# Patient Record
Sex: FEMALE | Race: WHITE | NOT HISPANIC OR LATINO | Employment: FULL TIME
[De-identification: names, ages, dates, MRNs, and addresses within clinical notes are randomized per-mention and may not be internally consistent; named-entity substitution may affect disease eponyms.]

---

## 2017-07-22 ENCOUNTER — HEALTH MAINTENANCE LETTER (OUTPATIENT)
Age: 52
End: 2017-07-22

## 2018-09-24 ENCOUNTER — HOSPITAL ENCOUNTER (OUTPATIENT)
Dept: MAMMOGRAPHY | Facility: CLINIC | Age: 53
Discharge: HOME | End: 2018-09-24
Attending: OBSTETRICS & GYNECOLOGY | Admitting: OBSTETRICS & GYNECOLOGY
Payer: COMMERCIAL

## 2018-09-24 DIAGNOSIS — Z12.31 VISIT FOR SCREENING MAMMOGRAM: ICD-10-CM

## 2018-09-24 PROCEDURE — 77067 SCR MAMMO BI INCL CAD: CPT

## 2019-10-18 ENCOUNTER — HOSPITAL ENCOUNTER (OUTPATIENT)
Dept: MAMMOGRAPHY | Facility: CLINIC | Age: 54
Discharge: HOME | End: 2019-10-18
Attending: OBSTETRICS & GYNECOLOGY | Admitting: OBSTETRICS & GYNECOLOGY
Payer: COMMERCIAL

## 2019-10-18 DIAGNOSIS — Z12.31 VISIT FOR SCREENING MAMMOGRAM: ICD-10-CM

## 2019-10-18 PROCEDURE — 77063 BREAST TOMOSYNTHESIS BI: CPT

## 2019-11-03 ENCOUNTER — HEALTH MAINTENANCE LETTER (OUTPATIENT)
Age: 54
End: 2019-11-03

## 2020-10-10 ENCOUNTER — NURSE TRIAGE (OUTPATIENT)
Dept: NURSING | Facility: CLINIC | Age: 55
End: 2020-10-10

## 2020-10-10 ENCOUNTER — ANCILLARY PROCEDURE (OUTPATIENT)
Dept: GENERAL RADIOLOGY | Facility: CLINIC | Age: 55
End: 2020-10-10
Attending: FAMILY MEDICINE
Payer: COMMERCIAL

## 2020-10-10 ENCOUNTER — OFFICE VISIT (OUTPATIENT)
Dept: URGENT CARE | Facility: URGENT CARE | Age: 55
End: 2020-10-10
Payer: COMMERCIAL

## 2020-10-10 VITALS
DIASTOLIC BLOOD PRESSURE: 84 MMHG | TEMPERATURE: 98.4 F | WEIGHT: 188 LBS | RESPIRATION RATE: 20 BRPM | OXYGEN SATURATION: 100 % | BODY MASS INDEX: 31.28 KG/M2 | HEART RATE: 80 BPM | SYSTOLIC BLOOD PRESSURE: 118 MMHG

## 2020-10-10 DIAGNOSIS — M79.671 RIGHT FOOT PAIN: Primary | ICD-10-CM

## 2020-10-10 PROCEDURE — 99204 OFFICE O/P NEW MOD 45 MIN: CPT | Performed by: FAMILY MEDICINE

## 2020-10-10 PROCEDURE — 73630 X-RAY EXAM OF FOOT: CPT | Mod: RT | Performed by: RADIOLOGY

## 2020-10-10 RX ORDER — HYDROCODONE BITARTRATE AND ACETAMINOPHEN 5; 325 MG/1; MG/1
1 TABLET ORAL EVERY 6 HOURS PRN
Qty: 10 TABLET | Refills: 0 | Status: SHIPPED | OUTPATIENT
Start: 2020-10-10 | End: 2020-10-13

## 2020-10-10 NOTE — PATIENT INSTRUCTIONS
1. Heat to ankle . 10 minutes   2. Plantar fasciitis treatment to the right foot .    3.  Elevate foot and ankle when possible    4.  Expecting improvement over this week please have this seen.  Sooner if no improvement

## 2020-10-10 NOTE — PROGRESS NOTES
SUBJECTIVE:   Mikki Das is a 55 year old female presenting with a chief complaint of   Chief Complaint   Patient presents with     Urgent Care     3 days, tried advil and ice     Musculoskeletal Problem     Constant pain in right foot, locatiokn of pain moves, ankle is also swollen and tender spot on lower leg     She has had this pain for the 3 days and is tried measures that have not decreased her pain or her swelling.  On her foot the plantar aspect on the heel is the area of greatest tenderness.  She says it is a constant tenderness that she has in this area and on her foot.  She has had plantar fasciitis in the left foot and she said this is not similar because the pain is constant.    She says she has some swelling more in the area of the plantar fascia and an area of tenderness on the right ankle medial near the joint.  She is not complaining of any pain in her calf she is walking with a limp because of the pain in the foot.    She has had no fever no chills.  Her past medical history is not significant for DVT plantar fasciitis yes.        She is an established patient of [facility].        Review of Systems   Musculoskeletal:        Right foot pain bottom of the foot.  Right inner ankle pain swelling   All other systems reviewed and are negative.      Past Medical History:   Diagnosis Date     NO ACTIVE PROBLEMS      Family History   Problem Relation Age of Onset     Lipids Father      Current Outpatient Medications   Medication Sig Dispense Refill     HYDROcodone-acetaminophen (NORCO) 5-325 MG tablet Take 1 tablet by mouth every 6 hours as needed for severe pain 10 tablet 0     nabumetone (RELAFEN) 750 MG tablet Take 1 tablet (750 mg) by mouth 2 times daily 20 tablet 0     SPRINTEC 28 0.25-35 MG-MCG OR TABS 1 TABLET DAILY 28 0     Ciclopirox 8 % SOLN Externally apply 1 drop topically daily. (Patient not taking: Reported on 10/10/2020) 1 Bottle 3     Social History     Tobacco Use     Smoking status:  Never Smoker     Smokeless tobacco: Never Used   Substance Use Topics     Alcohol use: Yes     Comment: rare       OBJECTIVE  /84   Pulse 80   Temp 98.4  F (36.9  C) (Tympanic)   Resp 20   Wt 85.3 kg (188 lb)   SpO2 100%   BMI 31.28 kg/m      Physical Exam  Vitals signs and nursing note reviewed.   HENT:      Head: Normocephalic.   Eyes:      Extraocular Movements: Extraocular movements intact.      Pupils: Pupils are equal, round, and reactive to light.   Neck:      Musculoskeletal: Normal range of motion.   Cardiovascular:      Rate and Rhythm: Normal rate.   Pulmonary:      Effort: Pulmonary effort is normal.   Musculoskeletal:      Comments: She walks with a limp.  Lightly trending on the right foot.    Right foot plantar fascial pain to palpation.  Remainder of the foot seem to have minimal if any tenderness.    Right inner ankle from approximately ankle joint midline to the lateral side of the ankle there is tenderness.  Linear approximately 5 cm in length and appears swollen.  In addition it appears to be full or palpable underneath the skin.  The skin is showing no changes   Skin:     General: Skin is warm.   Neurological:      General: No focal deficit present.      Mental Status: She is alert.         Labs:  Results for orders placed or performed in visit on 10/10/20 (from the past 24 hour(s))   XR Foot Right G/E 3 Views    Narrative    FOOT RIGHT THREE OR MORE VIEWS October 10, 2020 12:08 PM     INDICATION: Right foot pain.     COMPARISON: None.      Impression    IMPRESSION:  1.  Bunion deformity. Mild hallux valgus and hypertrophic changes at  the first metatarsal head median eminence.  2.  Mild first metatarsophalangeal degenerative arthrosis.  3.  Plantar calcaneal spur.  4.  No fracture.    EMERSON CAMPBELL MD       X-Ray was done; x-ray shows no evidence of fracture.  She does have a calcaneal spur    ASSESSMENT:      ICD-10-CM    1. Right foot pain  M79.671 XR Foot Right G/E 3 Views      nabumetone (RELAFEN) 750 MG tablet     HYDROcodone-acetaminophen (NORCO) 5-325 MG tablet    2.  Superficial thrombophlebitis right inner ankle; I did palpate her calf and there is no evidence signs or symptoms to suggest that she has a DVT.  I do think that she has a superficial thrombophlebitis.  This is causing some swelling around the ankle and it may be even influencing the plantar aspect of her foot.    We did discuss that she needs to be improving with the medications and treatment to the foot.  If there is no improvement next 24 to 48 hours she should be seen.    If her calf starts to hurt or she is having increased inability to walk she needs to be seen ASAP    She was having no breathing problems today    Medical Decision Making:    Differential Diagnosis:  Thrombophlebitis, foot pain, plantar fasciitis,    Serious Comorbid Conditions:  Adult:  None    PLAN:        Followup:    Please follow-up this week    Patient Instructions   1. Heat to ankle . 10 minutes   2. Plantar fasciitis treatment to the right foot .    3.  Elevate foot and ankle when possible    4.  Expecting improvement over this week please have this seen.  Sooner if no improvement

## 2020-10-10 NOTE — TELEPHONE ENCOUNTER
Called regarding right foot pain (described pain to be intense).  States that pain goes back and forth from foot to heel and arch and this has been going on x 3 days.  States that there is a spot on the calf that is slightly bruised and painful.  Caller states she could not sleep last night due to pain despite taken pain medication (ibuprofen) and using ice pack.  States that it is painful when she put weight on the foot.  Caller states that she noticed a mild swelling around the ankle this morning  Caller denied any known injury.  Chary Mcdaniel RN  COVID 19 Nurse Triage Plan/Patient Instructions    Please be aware that novel coronavirus (COVID-19) may be circulating in the community. If you develop symptoms such as fever, cough, or SOB or if you have concerns about the presence of another infection including coronavirus (COVID-19), please contact your health care provider or visit [email or web address].     Disposition/Instructions    In-Person Visit with provider recommended. Reference Visit Selection Guide.    Thank you for taking steps to prevent the spread of this virus.  o Limit your contact with others.  o Wear a simple mask to cover your cough.  o Wash your hands well and often.    Resources    [facility]: About COVID-19: [email or web address]    CDC: What to Do If You're Sick: www.cdc.gov/coronavirus/2019-ncov/about/steps-when-sick.html    CDC: Ending Home Isolation: www.cdc.gov/coronavirus/2019-ncov/hcp/disposition-in-home-patients.html     CDC: Caring for Someone: www.cdc.gov/coronavirus/2019-ncov/if-you-are-sick/care-for-someone.html     [facility]: Interim Guidance for Hospital Discharge to Home: www.health.[facility].[place]/diseases/coronavirus/hcp/hospdischarge.pdf    [facility] clinical trials (COVID-19 research studies): [email or web address]/umn-clinical-trials     Below are the COVID-19 hotlines at the [place] Department of [facility] ([facility]). Interpreters are available.   o For [employer]  questions: Call 583-441-3853 or 1-857.706.1330 (7 a.m. to 7 p.m.)  o For questions about schools and childcare: Call 436-807-7891 or 1-809.362.9070 (7 a.m. to 7 p.m.)                     Additional Information    Negative: Followed a foot injury    Negative: Diabetes    Negative: Ankle pain is main symptom    Negative: Thigh or calf pain is main symptom    Negative: Entire foot is cool or blue in comparison to other foot    Negative: Purple or black skin on foot or toe    Negative: [1] Red area or streak AND [2] fever    Negative: [1] Swollen foot AND [2] fever    Negative: Patient sounds very sick or weak to the triager    [1] SEVERE pain (e.g., excruciating, unable to do any normal activities) AND [2] not improved after 2 hours of pain medicine    Protocols used: FOOT PAIN-A-AH

## 2020-11-16 ENCOUNTER — HEALTH MAINTENANCE LETTER (OUTPATIENT)
Age: 55
End: 2020-11-16

## 2020-12-01 ENCOUNTER — TRANSFERRED RECORDS (OUTPATIENT)
Dept: MULTI SPECIALTY CLINIC | Facility: CLINIC | Age: 55
End: 2020-12-01

## 2020-12-01 LAB — PAP SMEAR - HIM PATIENT REPORTED: NEGATIVE

## 2020-12-17 ENCOUNTER — TRANSFERRED RECORDS (OUTPATIENT)
Dept: HEALTH INFORMATION MANAGEMENT | Facility: CLINIC | Age: 55
End: 2020-12-17

## 2020-12-17 LAB — PAP-ABSTRACT: NORMAL

## 2021-02-07 ENCOUNTER — HEALTH MAINTENANCE LETTER (OUTPATIENT)
Age: 56
End: 2021-02-07

## 2021-02-09 ENCOUNTER — TRANSFERRED RECORDS (OUTPATIENT)
Dept: HEALTH INFORMATION MANAGEMENT | Facility: CLINIC | Age: 56
End: 2021-02-09

## 2021-02-09 LAB
ALT SERPL-CCNC: 17 LU/L (ref 12–68)
AST SERPL-CCNC: 6 LU/L (ref 12–37)
CHOLESTEROL (EXTERNAL): 193 MG/DL
CREATININE (EXTERNAL): 0.92 MG/DL (ref 0.55–1.02)
GFR ESTIMATED (EXTERNAL): >60 ML/MIN
GFR ESTIMATED (IF AFRICAN AMERICAN) (EXTERNAL): >60 ML/MIN
GLUCOSE (EXTERNAL): 91 MG/DL (ref 74–106)
HDLC SERPL-MCNC: 52 MG/DL
LDL CHOLESTEROL CALCULATED (EXTERNAL): 112 MG/DL
POTASSIUM (EXTERNAL): 4.3 MMOL/L (ref 3.5–5.1)
TRIGLYCERIDES (EXTERNAL): 146 MG/DL
TSH SERPL-ACNC: 2.52 ULU/ML (ref 0.36–3.74)

## 2021-02-11 ENCOUNTER — HOSPITAL ENCOUNTER (OUTPATIENT)
Dept: MAMMOGRAPHY | Facility: CLINIC | Age: 56
Discharge: HOME | End: 2021-02-11
Attending: OBSTETRICS & GYNECOLOGY | Admitting: OBSTETRICS & GYNECOLOGY
Payer: COMMERCIAL

## 2021-02-11 DIAGNOSIS — Z12.31 VISIT FOR SCREENING MAMMOGRAM: ICD-10-CM

## 2021-02-11 PROCEDURE — 77063 BREAST TOMOSYNTHESIS BI: CPT

## 2021-04-07 ENCOUNTER — IMMUNIZATION (OUTPATIENT)
Dept: NURSING | Facility: CLINIC | Age: 56
End: 2021-04-07
Payer: COMMERCIAL

## 2021-04-07 PROCEDURE — 91300 PR COVID VAC PFIZER DIL RECON 30 MCG/0.3 ML IM: CPT

## 2021-04-07 PROCEDURE — 0001A PR COVID VAC PFIZER DIL RECON 30 MCG/0.3 ML IM: CPT

## 2021-04-28 ENCOUNTER — IMMUNIZATION (OUTPATIENT)
Dept: NURSING | Facility: CLINIC | Age: 56
End: 2021-04-28
Attending: INTERNAL MEDICINE
Payer: COMMERCIAL

## 2021-04-28 PROCEDURE — 91300 PR COVID VAC PFIZER DIL RECON 30 MCG/0.3 ML IM: CPT

## 2021-04-28 PROCEDURE — 0002A PR COVID VAC PFIZER DIL RECON 30 MCG/0.3 ML IM: CPT

## 2021-08-25 ENCOUNTER — APPOINTMENT (OUTPATIENT)
Dept: CT IMAGING | Facility: CLINIC | Age: 56
End: 2021-08-25
Attending: EMERGENCY MEDICINE
Payer: COMMERCIAL

## 2021-08-25 ENCOUNTER — APPOINTMENT (OUTPATIENT)
Dept: MRI IMAGING | Facility: CLINIC | Age: 56
End: 2021-08-25
Attending: EMERGENCY MEDICINE
Payer: COMMERCIAL

## 2021-08-25 ENCOUNTER — APPOINTMENT (OUTPATIENT)
Dept: CT IMAGING | Facility: CLINIC | Age: 56
End: 2021-08-25
Attending: PHYSICIAN ASSISTANT
Payer: COMMERCIAL

## 2021-08-25 ENCOUNTER — HOSPITAL ENCOUNTER (OUTPATIENT)
Facility: CLINIC | Age: 56
Setting detail: OBSERVATION
Discharge: OTHER INSTITUTION | End: 2021-08-25
Attending: EMERGENCY MEDICINE | Admitting: INTERNAL MEDICINE
Payer: COMMERCIAL

## 2021-08-25 VITALS
DIASTOLIC BLOOD PRESSURE: 66 MMHG | WEIGHT: 188 LBS | SYSTOLIC BLOOD PRESSURE: 110 MMHG | HEART RATE: 72 BPM | BODY MASS INDEX: 30.22 KG/M2 | HEIGHT: 66 IN | OXYGEN SATURATION: 99 % | RESPIRATION RATE: 20 BRPM | TEMPERATURE: 98.2 F

## 2021-08-25 DIAGNOSIS — R29.898 WEAKNESS OF LEFT ARM: ICD-10-CM

## 2021-08-25 DIAGNOSIS — D64.9 ANEMIA, UNSPECIFIED TYPE: ICD-10-CM

## 2021-08-25 DIAGNOSIS — G40.909 RECURRENT SEIZURES (H): ICD-10-CM

## 2021-08-25 LAB
ALBUMIN SERPL-MCNC: 3.2 G/DL (ref 3.4–5)
ALBUMIN UR-MCNC: NEGATIVE MG/DL
ALP SERPL-CCNC: 60 U/L (ref 40–150)
ALT SERPL W P-5'-P-CCNC: 14 U/L (ref 0–50)
ANION GAP SERPL CALCULATED.3IONS-SCNC: 9 MMOL/L (ref 3–14)
APPEARANCE UR: CLEAR
AST SERPL W P-5'-P-CCNC: 10 U/L (ref 0–45)
ATRIAL RATE - MUSE: 103 BPM
BASOPHILS # BLD AUTO: 0.1 10E3/UL (ref 0–0.2)
BASOPHILS NFR BLD AUTO: 1 %
BILIRUB SERPL-MCNC: 0.2 MG/DL (ref 0.2–1.3)
BILIRUB UR QL STRIP: NEGATIVE
BUN SERPL-MCNC: 15 MG/DL (ref 7–30)
CALCIUM SERPL-MCNC: 8.1 MG/DL (ref 8.5–10.1)
CANCER AG125 SERPL-ACNC: 31 U/ML (ref 0–30)
CHLORIDE BLD-SCNC: 112 MMOL/L (ref 94–109)
CO2 SERPL-SCNC: 19 MMOL/L (ref 20–32)
COLOR UR AUTO: NORMAL
CREAT SERPL-MCNC: 0.96 MG/DL (ref 0.52–1.04)
DIASTOLIC BLOOD PRESSURE - MUSE: NORMAL MMHG
EOSINOPHIL # BLD AUTO: 0.1 10E3/UL (ref 0–0.7)
EOSINOPHIL NFR BLD AUTO: 2 %
ERYTHROCYTE [DISTWIDTH] IN BLOOD BY AUTOMATED COUNT: 16.4 % (ref 10–15)
ETHANOL SERPL-MCNC: <0.01 G/DL
GFR SERPL CREATININE-BSD FRML MDRD: 67 ML/MIN/1.73M2
GLUCOSE BLD-MCNC: 118 MG/DL (ref 70–99)
GLUCOSE BLDC GLUCOMTR-MCNC: 119 MG/DL (ref 70–99)
GLUCOSE UR STRIP-MCNC: NEGATIVE MG/DL
HCT VFR BLD AUTO: 31.2 % (ref 35–47)
HEMOCCULT STL QL: NEGATIVE
HGB BLD-MCNC: 8.8 G/DL (ref 11.7–15.7)
HGB UR QL STRIP: NEGATIVE
HOLD SPECIMEN: NORMAL
IMM GRANULOCYTES # BLD: 0 10E3/UL
IMM GRANULOCYTES NFR BLD: 1 %
INR PPP: 0.99 (ref 0.85–1.15)
INTERPRETATION ECG - MUSE: NORMAL
KETONES UR STRIP-MCNC: NEGATIVE MG/DL
LEUKOCYTE ESTERASE UR QL STRIP: NEGATIVE
LYMPHOCYTES # BLD AUTO: 2 10E3/UL (ref 0.8–5.3)
LYMPHOCYTES NFR BLD AUTO: 33 %
MAGNESIUM SERPL-MCNC: 2.1 MG/DL (ref 1.6–2.3)
MCH RBC QN AUTO: 21.1 PG (ref 26.5–33)
MCHC RBC AUTO-ENTMCNC: 28.2 G/DL (ref 31.5–36.5)
MCV RBC AUTO: 75 FL (ref 78–100)
MONOCYTES # BLD AUTO: 0.6 10E3/UL (ref 0–1.3)
MONOCYTES NFR BLD AUTO: 9 %
NEUTROPHILS # BLD AUTO: 3.2 10E3/UL (ref 1.6–8.3)
NEUTROPHILS NFR BLD AUTO: 54 %
NITRATE UR QL: NEGATIVE
NRBC # BLD AUTO: 0 10E3/UL
NRBC BLD AUTO-RTO: 0 /100
P AXIS - MUSE: 43 DEGREES
PH UR STRIP: 6.5 [PH] (ref 5–7)
PLATELET # BLD AUTO: 359 10E3/UL (ref 150–450)
POTASSIUM BLD-SCNC: 3.7 MMOL/L (ref 3.4–5.3)
PR INTERVAL - MUSE: 160 MS
PROT SERPL-MCNC: 6.5 G/DL (ref 6.8–8.8)
QRS DURATION - MUSE: 74 MS
QT - MUSE: 366 MS
QTC - MUSE: 479 MS
R AXIS - MUSE: 14 DEGREES
RBC # BLD AUTO: 4.17 10E6/UL (ref 3.8–5.2)
RBC URINE: <1 /HPF
SARS-COV-2 RNA RESP QL NAA+PROBE: NEGATIVE
SODIUM SERPL-SCNC: 140 MMOL/L (ref 133–144)
SP GR UR STRIP: 1 (ref 1–1.03)
SYSTOLIC BLOOD PRESSURE - MUSE: NORMAL MMHG
T AXIS - MUSE: 44 DEGREES
T4 FREE SERPL-MCNC: 0.84 NG/DL (ref 0.76–1.46)
TSH SERPL DL<=0.005 MIU/L-ACNC: 5.69 MU/L (ref 0.4–4)
UROBILINOGEN UR STRIP-MCNC: NORMAL MG/DL
VENTRICULAR RATE- MUSE: 103 BPM
WBC # BLD AUTO: 5.9 10E3/UL (ref 4–11)
WBC URINE: <1 /HPF

## 2021-08-25 PROCEDURE — 96368 THER/DIAG CONCURRENT INF: CPT

## 2021-08-25 PROCEDURE — 250N000011 HC RX IP 250 OP 636: Performed by: PHYSICIAN ASSISTANT

## 2021-08-25 PROCEDURE — 71250 CT THORAX DX C-: CPT

## 2021-08-25 PROCEDURE — 82272 OCCULT BLD FECES 1-3 TESTS: CPT | Performed by: EMERGENCY MEDICINE

## 2021-08-25 PROCEDURE — 96365 THER/PROPH/DIAG IV INF INIT: CPT | Mod: 59

## 2021-08-25 PROCEDURE — 83735 ASSAY OF MAGNESIUM: CPT | Performed by: EMERGENCY MEDICINE

## 2021-08-25 PROCEDURE — 250N000013 HC RX MED GY IP 250 OP 250 PS 637: Performed by: EMERGENCY MEDICINE

## 2021-08-25 PROCEDURE — 255N000002 HC RX 255 OP 636: Performed by: EMERGENCY MEDICINE

## 2021-08-25 PROCEDURE — 96375 TX/PRO/DX INJ NEW DRUG ADDON: CPT

## 2021-08-25 PROCEDURE — 82077 ASSAY SPEC XCP UR&BREATH IA: CPT | Performed by: EMERGENCY MEDICINE

## 2021-08-25 PROCEDURE — C9803 HOPD COVID-19 SPEC COLLECT: HCPCS

## 2021-08-25 PROCEDURE — 85610 PROTHROMBIN TIME: CPT | Performed by: EMERGENCY MEDICINE

## 2021-08-25 PROCEDURE — 81001 URINALYSIS AUTO W/SCOPE: CPT | Performed by: EMERGENCY MEDICINE

## 2021-08-25 PROCEDURE — 258N000003 HC RX IP 258 OP 636: Performed by: EMERGENCY MEDICINE

## 2021-08-25 PROCEDURE — 0042T CT HEAD PERFUSION WITH CONTRAST: CPT

## 2021-08-25 PROCEDURE — 36415 COLL VENOUS BLD VENIPUNCTURE: CPT | Performed by: EMERGENCY MEDICINE

## 2021-08-25 PROCEDURE — 250N000011 HC RX IP 250 OP 636

## 2021-08-25 PROCEDURE — 258N000003 HC RX IP 258 OP 636: Performed by: PHYSICIAN ASSISTANT

## 2021-08-25 PROCEDURE — 85025 COMPLETE CBC W/AUTO DIFF WBC: CPT | Performed by: EMERGENCY MEDICINE

## 2021-08-25 PROCEDURE — 86304 IMMUNOASSAY TUMOR CA 125: CPT | Performed by: PHYSICIAN ASSISTANT

## 2021-08-25 PROCEDURE — 70498 CT ANGIOGRAPHY NECK: CPT

## 2021-08-25 PROCEDURE — 70450 CT HEAD/BRAIN W/O DYE: CPT

## 2021-08-25 PROCEDURE — 84439 ASSAY OF FREE THYROXINE: CPT | Performed by: EMERGENCY MEDICINE

## 2021-08-25 PROCEDURE — 250N000009 HC RX 250: Performed by: EMERGENCY MEDICINE

## 2021-08-25 PROCEDURE — G0378 HOSPITAL OBSERVATION PER HR: HCPCS

## 2021-08-25 PROCEDURE — 96366 THER/PROPH/DIAG IV INF ADDON: CPT

## 2021-08-25 PROCEDURE — 99285 EMERGENCY DEPT VISIT HI MDM: CPT | Mod: 25

## 2021-08-25 PROCEDURE — 84443 ASSAY THYROID STIM HORMONE: CPT | Performed by: EMERGENCY MEDICINE

## 2021-08-25 PROCEDURE — 250N000011 HC RX IP 250 OP 636: Performed by: EMERGENCY MEDICINE

## 2021-08-25 PROCEDURE — 87635 SARS-COV-2 COVID-19 AMP PRB: CPT | Performed by: EMERGENCY MEDICINE

## 2021-08-25 PROCEDURE — 70553 MRI BRAIN STEM W/O & W/DYE: CPT

## 2021-08-25 PROCEDURE — 70450 CT HEAD/BRAIN W/O DYE: CPT | Mod: 76

## 2021-08-25 PROCEDURE — 70498 CT ANGIOGRAPHY NECK: CPT | Mod: 76

## 2021-08-25 PROCEDURE — 80053 COMPREHEN METABOLIC PANEL: CPT | Performed by: EMERGENCY MEDICINE

## 2021-08-25 PROCEDURE — 96367 TX/PROPH/DG ADDL SEQ IV INF: CPT

## 2021-08-25 PROCEDURE — A9585 GADOBUTROL INJECTION: HCPCS | Performed by: EMERGENCY MEDICINE

## 2021-08-25 PROCEDURE — 93005 ELECTROCARDIOGRAM TRACING: CPT

## 2021-08-25 RX ORDER — LORAZEPAM 2 MG/ML
INJECTION INTRAMUSCULAR
Status: COMPLETED
Start: 2021-08-25 | End: 2021-08-25

## 2021-08-25 RX ORDER — LEVETIRACETAM 10 MG/ML
1000 INJECTION INTRAVASCULAR ONCE
Status: COMPLETED | OUTPATIENT
Start: 2021-08-25 | End: 2021-08-25

## 2021-08-25 RX ORDER — IBUPROFEN 200 MG
200 TABLET ORAL EVERY 6 HOURS PRN
COMMUNITY
End: 2021-09-21

## 2021-08-25 RX ORDER — ACETAMINOPHEN 500 MG
1000 TABLET ORAL ONCE
Status: COMPLETED | OUTPATIENT
Start: 2021-08-25 | End: 2021-08-25

## 2021-08-25 RX ORDER — ONDANSETRON 2 MG/ML
4 INJECTION INTRAMUSCULAR; INTRAVENOUS ONCE
Status: COMPLETED | OUTPATIENT
Start: 2021-08-25 | End: 2021-08-25

## 2021-08-25 RX ORDER — LORAZEPAM 2 MG/ML
0.5 INJECTION INTRAMUSCULAR ONCE
Status: COMPLETED | OUTPATIENT
Start: 2021-08-25 | End: 2021-08-25

## 2021-08-25 RX ORDER — HEPARIN SODIUM 10000 [USP'U]/100ML
0-5000 INJECTION, SOLUTION INTRAVENOUS CONTINUOUS
Status: DISCONTINUED | OUTPATIENT
Start: 2021-08-25 | End: 2021-08-25 | Stop reason: HOSPADM

## 2021-08-25 RX ORDER — SODIUM CHLORIDE 9 MG/ML
INJECTION, SOLUTION INTRAVENOUS ONCE
Status: COMPLETED | OUTPATIENT
Start: 2021-08-25 | End: 2021-08-25

## 2021-08-25 RX ORDER — GADOBUTROL 604.72 MG/ML
10 INJECTION INTRAVENOUS ONCE
Status: COMPLETED | OUTPATIENT
Start: 2021-08-25 | End: 2021-08-25

## 2021-08-25 RX ORDER — IOPAMIDOL 755 MG/ML
500 INJECTION, SOLUTION INTRAVASCULAR ONCE
Status: COMPLETED | OUTPATIENT
Start: 2021-08-25 | End: 2021-08-25

## 2021-08-25 RX ADMIN — ACETAMINOPHEN 1000 MG: 500 TABLET, FILM COATED ORAL at 15:49

## 2021-08-25 RX ADMIN — LORAZEPAM 0.5 MG: 2 INJECTION INTRAMUSCULAR at 06:24

## 2021-08-25 RX ADMIN — IOPAMIDOL 120 ML: 755 INJECTION, SOLUTION INTRAVENOUS at 10:15

## 2021-08-25 RX ADMIN — SODIUM CHLORIDE 95 ML: 9 INJECTION, SOLUTION INTRAVENOUS at 10:15

## 2021-08-25 RX ADMIN — GADOBUTROL 8.5 ML: 604.72 INJECTION INTRAVENOUS at 09:31

## 2021-08-25 RX ADMIN — HEPARIN SODIUM 1024 UNITS/HR: 10000 INJECTION, SOLUTION INTRAVENOUS at 12:27

## 2021-08-25 RX ADMIN — LEVETIRACETAM 1000 MG: 10 INJECTION INTRAVENOUS at 07:51

## 2021-08-25 RX ADMIN — SODIUM CHLORIDE: 9 INJECTION, SOLUTION INTRAVENOUS at 11:34

## 2021-08-25 RX ADMIN — ONDANSETRON 4 MG: 2 INJECTION INTRAMUSCULAR; INTRAVENOUS at 16:04

## 2021-08-25 RX ADMIN — LORAZEPAM 0.5 MG: 2 INJECTION INTRAMUSCULAR; INTRAVENOUS at 06:24

## 2021-08-25 RX ADMIN — LEVETIRACETAM 750 MG: 100 INJECTION, SOLUTION INTRAVENOUS at 11:34

## 2021-08-25 ASSESSMENT — MIFFLIN-ST. JEOR: SCORE: 1469.51

## 2021-08-25 NOTE — ED PROVIDER NOTES
History     Chief Complaint:    Seizures    History is limited and that during the history the patient had a seizure  HPI   Mikki Das is a 56 year old female who presents without a history of seizures and on remarkable recent events presents with seizure-like activity and unresponsive episode this morning.  The patient's  denies that she has drank alcohol or had any trauma or seizure history.  He does state that she has had recent prolonged and heavy periods.  The patient noted numbness of her left arm with some twitching.  This morning she was unresponsive in the bathroom after an episode of this.  She denies any antecedent symptoms.  Further history is not obtainable because the patient had a seizure at this point.    Review of Systems  10 point review of systems all negative except as in HPI.    Allergies:    No Known Allergies      Medications:      nabumetone (RELAFEN) 750 MG tablet  SPRINTEC 28 0.25-35 MG-MCG OR TABS        Past Medical History:    History of heavy periods.  Past Medical History:   Diagnosis Date     NO ACTIVE PROBLEMS      Patient Active Problem List    Diagnosis Date Noted     CARDIOVASCULAR SCREENING; LDL GOAL LESS THAN 160 10/31/2010     Priority: Medium        Past Surgical History:      Past Surgical History:   Procedure Laterality Date     D & C  1996       Family History:      Family History   Problem Relation Age of Onset     Lipids Father        Social History:   here with her   He denies that she drinks alcohol    Physical Exam     Patient Vitals for the past 24 hrs:   BP Temp Temp src Pulse Resp   08/25/21 0606 (!) 139/90 98.2  F (36.8  C) Oral 98 15       Physical Exam  General: The patient is alert, in no respiratory distress.    HENT: Mucous membranes moist.    Cardiovascular: Regular rate and rhythm. Good pulses in all four extremities. Normal capillary refill and skin turgor.     Respiratory: Lungs are clear. No nasal flaring. No retractions. No  wheezing, no crackles.    Gastrointestinal: Abdomen soft. No guarding, no rebound. No palpable hernias.     Musculoskeletal: No gross deformity.     Skin: No rashes or petechiae.     Neurologic: The patient is alert and oriented.  Speech is clear no facial droop.  She does have trouble extending the fingers of her left hand.  She can lift both arms and has gross movement intact.  Before this part of the exam could be finished the patient had a generalized tonic-clonic seizure lasting 3 minutes.    Lymphatic: No cervical adenopathy. No lower extremity swelling.    Psychiatric: The patient is non-tearful.      Emergency Department Course   ECG:  Sinus tachycardia ventricular 103  QRS was 74 and a QTC of 479 no pathologic ST ovation    Imaging:  CT head read by the radiologist: No CT evidence of acute intracranial process, brain atrophy and presumed chronic microvascular ischemia changes    MRI of the brain with and without contrast as read by the radiologist: Some restricted diffusion in the right posterior lateral frontal cortex and some associated T2 increased signal intensity and some cortical swelling.  Some subarachnoid hemorrhage is also noted within this lesion.  There is also some leptomeningeal enhancement associate with this lesion...  There is a small signal hyperintensity in left thalamus which could represent a tiny old infarct....  The major dural venous sinuses appear patent.  Impression 1 focal right posterior lateral frontal vein with some swelling and some adjacent leptomeningeal enhancement and subarachnoid hemorrhage.  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  Other differential diagnostic possibilities include acute to subacute infarct, focal encephalitis or post seizure effects.    Laboratory:  CBC: White blood cell count 5.9 hemoglobin 8.8 hematocrit 31.2 previously had been hemoglobin 13.2 hematocrit 38.2 platelets 359  CMP: Within normal limits except chloride  112 CO2 19 calcium 8.1 glucose 118 creatinine 0.96, protein low albumin low  INR within normal limits  Magnesium 2.1  TSH is elevated at 5.69  Alcohol is negative  T4 is within normal less than 0.84      Emergency Department Course:    Reviewed:    I reviewed nursing notes, vitals and past history    Assessments:   I obtained history and examined the patient as noted above.    I rechecked the patient after the CT scan.  She was able answer questions complained of gross weakness and could not still extend the fingers of her left hand.  I updated patient regarding the results of the MRI.  A tier 2 stroke code was called.    There were no beds available here at [facility] or in the [facility] system and the only ICU bed available was at [facility].    Consults:   Dr. Patel of neurology.  He recommended an MRI and Keppra load.  Dr Sneed of radiology -discussed results of the MRI reporting that there may be a cortical vein thrombosis and small subarachnoid hemorrhage.  Stroke neurology regarding the abnormality on MRI.  They recommended an emergent CT a of the head and neck and CTV of the head.   I discussed the results of the CT imaging with stroke neurology who recommended a low-dose nonbolused heparin and IV fluid hydration.    Interventions:    Medications   LORazepam (ATIVAN) injection 0.5 mg (0.5 mg Intravenous Given 8/25/21 0624)       Disposition:  The patient was transferred to [facility] ICU via EMS. Dr. Gonzalez accepted the patient for transfer.    Impression & Plan      Medical Decision Making:  The patient presented complaining of left arm tingling but actually had weakness on that left side.  Even before I could complete the history and physical exam the patient had a seizure which I think is likely the cause behind this.  Santos's paralysis would likely be the culprit.  I consulted both neurology and stroke neurology and discussed the case with radiology as well regarding the likely thrombosis with  small adjacent subarachnoid hemorrhage.  The small subarachnoid is likely due to the blockage and therefore stroke neurology recommended heparinization.  Discussed the risk benefits with the patient.  She was loaded with Keppra.  Unfortunately no ICU beds in the [place] system and therefore she was transferred to the [facility].  The patient was stable with the weakness of her left hand I did not think there was likely a fracture or nerve impingement as a cause behind this.  The patient was not hypotensive.  Of note I did note that her hemoglobin was low compared to one from several years ago however this may be due to her prolonged periods.  The hospitalist while here and working her up ordered a CT scan and consider to [place] because however I am not sure that is the exact cause behind this.  The thrombosis is likely leading to the seizure activity.  The patient was transferred via EMS on a heparin drip with a thrombosis and a small subarachnoid and was transported in good condition.  Critical Care time:  was 95 minutes for this patient excluding procedures.    Covid-19  Mikki Das was evaluated during a global COVID-19 pandemic, which necessitated consideration that the patient might be at risk for infection with the SARS-CoV-2 virus that causes COVID-19.   Applicable protocols for evaluation were followed during the patient's care.   COVID-19 was considered as part of the patient's evaluation. The plan for testing is:  a test was obtained during this visit.    Diagnosis:  1. Recurrent seizures (H)    2. Weakness of left arm    3. Anemia, unspecified type            Scribe Disclosure:  Dilshad SINGLETON MD, MD, am serving as a scribe at 6:43 AM on 8/25/2021 to document services personally performed by Dilshad Bae MD based on my observations and the provider's statements to me.      Dilshad Bae MD  08/26/21 1759

## 2021-08-25 NOTE — CONSULTS
[facility]    Stroke Telephone Note    I was called by Dilshad Bae Md on 08/25/21 regarding patient Mikki Das. The patient is a 56 year old female with history of alcohol abuse and possible OCP use presented to the ED for evaluation after an unresponsive episode this morning. Patient reports having ongoing numbness in her left arm for most of yesterday. This morning patient reported to have an episode of unresponsiveness while in bathroom. In ED, patient with witnessed focal seizure in LUE, given ativan. Vitals stable per report currently protecting airway. Per report no history of seizure.     Stroke Code Data (for stroke code without tele)  Stroke code activated 08/25/21   1000   Stroke provider first response  08/25/21   1001            Last known normal 08/24/21 unclear    Time of discovery   (or onset of symptoms) 08/24/21 unclear    Head CT read by Stroke Neuro Dr/Provider 08/25/21   1005   Was stroke code de-escalated? No, active bleed      Imaging Findings   CT head with right frontal cortical swelling with associated SAH   MRI brain demonstrates leptomeningeal enhancement that correlates with the CT head findings   CTV suggestive of possible cortical vein thrombosis     Thrombolytic Treatment   Not given due to active bleeding.    Endovascular Treatment  Not initiated due to absence of proximal vessel occlusion. Likely cortical vein thrombosis     Impression  56F with history of alcohol abuse and possible on OCP with reported LUE numbness and twitching all day yesterday who presented to ED for evaluation after an episode of unresponsiveness. Patient with witnessed seizure in ED. Initial neuroimaging in ED reveals SAH with likley CVT.     Recommendations   -Transfer to [facility] or [facility] ICU with q2 neuro checks, if no ICU bed availability ok to transfer to station 73 at [facility]  -Loaded with 1g Keppra in ED, continue Keppra 750 BID   -Start low dose Heparin  "gtt without bolus  -Order vEEG once transferred   -Start IVF     My recommendations are based on the information provided over the phone by Mikki Das's in-person providers. They are not intended to replace the clinical judgment of her in-person providers. I was not requested to personally see or examine the patient at this time.    Ana Maria Kelly PA-C   Neurology  To page me or covering stroke neurology team member, click here: AMCOM   Choose \"On Call\" tab at top, then search dropdown box for \"Neurology Adult\", select location, press Enter, then look for stroke/neuro ICU/telestroke.         "

## 2021-08-25 NOTE — PHARMACY-ADMISSION MEDICATION HISTORY
Admission medication history interview status for this patient is complete. See [facility] admission navigator for allergy information, prior to admission medications and immunization status.     Medication history interview done, indicate source(s): Patient and spouse  Medication history resources (including written lists, pill bottles, clinic record):None  Pharmacy: [facility]     Changes made to PTA medication list:  Added: ibuprofen  Changed: BC from Sprintec to Tri-Lo-Cristina  Reported as Not Taking: nabumetone  Removed: nabumetone    Actions taken by pharmacist (provider contacted, etc):None     Additional medication history information: patient took aspirin 81 mg x 1 last night.    Medication reconciliation/reorder completed by provider prior to medication history?  N   (Y/N)     Prior to Admission medications    Medication Sig Last Dose Taking? Auth Provider   ibuprofen (ADVIL/MOTRIN) 200 MG tablet Take 200 mg by mouth every 6 hours as needed  Yes Unknown, Entered By History   Norgestim-Eth Estrad Triphasic (TRI-LO-CRISTINA PO) Take 1 tablet by mouth daily  Yes Unknown, Entered By History

## 2021-08-25 NOTE — ED NOTES
[facility]  ED Nurse Handoff Report    Mikki Das is a 56 year old female   ED Chief complaint: Seizures  . ED Diagnosis:   Final diagnoses:   Recurrent seizures (H)   Weakness of left arm   Anemia, unspecified type     Allergies: No Known Allergies    Code Status: Full Code  Activity level - Baseline/Home:  Independent. Activity Level - Current:   Stand by Assist. Lift room needed: No. Bariatric: No   Needed: No   Isolation: No. Infection: Not Applicable.     Vital Signs:   Vitals:    08/25/21 0715 08/25/21 0730 08/25/21 0745 08/25/21 0800   BP: 134/75 119/74 129/81    Pulse: 120 103 93 95   Resp:       Temp:       TempSrc:       SpO2: 98% 98% 98% 98%       Cardiac Rhythm:  ,      Pain level:    Patient confused: No. Patient Falls Risk: Yes.   Elimination Status: Has voided   Patient Report - Initial Complaint:  Aug 25 06:01 06:01:48 ED Triage Notes Filed ASAEL CHENG       Details:   Pt hubby reported pt was having L arm numbness the whole of yesteray  - was sluggish today   -went to bathroom this morning, ws found unresponsive not sure if she was convulsive or had just passed out  - per EMS L side was not moving but was able to move R side freely  -  - Took 81mg aspirin this am       Focused Assessment:    Aug 25 06:30 06:30:00 Neurological ASAEL CHENG       Details:   Cognitive - Cognitive/Neuro/Behavioral WDL: .WDL except; arousability  Level of Consciousness: lethargic  Arousal Level: arouses to voice  Follows Commands: inconsistent  Speech: whispers (postictal) Best Language: 1 - Mild to moderate  Mood/Behavior: calm   Tenisha Coma Scale - Best Eye Response: 3-->(E3) to speech  Best Motor Response: 5-->(M5) localizes pain  Best Verbal Response: 4-->(V4) confused  Tenisha Coma Scale Score: 12  Assessment Qualifiers: patient not sedated/intubated   Neuro - Swallowing Signs/Symptoms: drools  Memory Deficit: other (see comments)   Pupils (CN II) - Pupil PERRLA: yes   Pupil Size Left: 2 mm  Pupil Size Right: 2 mm   Cranial Nerve Assess - Facial Symmetry: Other (see comments) (Symetrical) Swallow/Gag: Other (see comments)         Aug 25 07:10 07:10:00 Respiratory DOMINGO MONAHAN       Details:   Respiratory - Respiratory WDL: WDL  Expansion/Accessory Muscles/Retractions: expansion symmetric; no retractions; no use of accessory muscles        Aug 25 07:10 07:10:00 Neurological DOMINGO MONAHAN      Details:   Cognitive - Level of Consciousness: alert  Arousal Level: opens eyes spontaneously  Orientation: oriented x 4  Follows Commands: yes  Speech: clear; spontaneous; logical  Best Language: 0 - No aphasia  Mood/Behavior: cooperative; calm   Kramer Coma Scale - Best Eye Response: 4-->(E4) spontaneous  Best Motor Response: 6-->(M6) obeys commands  Best Verbal Response: 5-->(V5) oriented  Tenisha Coma Scale Score: 15         Tests Performed: labs, imaging   Abnormal Results:   Head CT w/o contrast   Final Result   IMPRESSION:   1.  No CT evidence for acute intracranial process.   2.  Brain atrophy and presumed chronic microvascular ischemic changes as above.         MR Brain w/o & w Contrast    (Results Pending)     Labs Ordered and Resulted from Time of ED Arrival Up to the Time of Departure from the ED   COMPREHENSIVE METABOLIC PANEL - Abnormal; Notable for the following components:       Result Value    Chloride 112 (*)     Carbon Dioxide (CO2) 19 (*)     Calcium 8.1 (*)     Glucose 118 (*)     Protein Total 6.5 (*)     Albumin 3.2 (*)     All other components within normal limits   TSH WITH FREE T4 REFLEX - Abnormal; Notable for the following components:    TSH 5.69 (*)     All other components within normal limits   CBC WITH PLATELETS AND DIFFERENTIAL - Abnormal; Notable for the following components:    Hemoglobin 8.8 (*)     Hematocrit 31.2 (*)     MCV 75 (*)     MCH 21.1 (*)     MCHC 28.2 (*)     RDW 16.4 (*)     All other components within normal limits   GLUCOSE BY METER  - Abnormal; Notable for the following components:    GLUCOSE BY METER POCT 119 (*)     All other components within normal limits   INR - Normal   MAGNESIUM - Normal   ETHYL ALCOHOL LEVEL - Normal   COVID-19 VIRUS (CORONAVIRUS) BY PCR - Normal    Narrative:     Testing was performed using the magdaleno  SARS-CoV-2 & Influenza A/B Assay on the magdaleno  Daphne  System.  This test should be ordered for the detection of SARS-COV-2 in individuals who meet SARS-CoV-2 clinical and/or epidemiological criteria. Test performance is unknown in asymptomatic patients.  This test is for in vitro diagnostic use under the FDA EUA for laboratories certified under CLIA to perform moderate and/or high complexity testing. This test has not been FDA cleared or approved.  A negative test does not rule out the presence of PCR inhibitors in the specimen or target RNA in concentration below the limit of detection for the assay. The possibility of a false negative should be considered if the patient's recent exposure or clinical presentation suggests COVID-19.  [facility] are certified under the Clinical Laboratory Improvement Amendments of 1988 (CLIA-88) as qualified to perform moderate and/or high complexity laboratory testing.   T4 FREE - Normal   OCCULT BLOOD STOOL - Normal   CBC WITH PLATELETS & DIFFERENTIAL    Narrative:     The following orders were created for panel order CBC with platelets differential.  Procedure                               Abnormality         Status                     ---------                               -----------         ------                     CBC with platelets and d...[[id number]]  Abnormal            Final result                 Please view results for these tests on the individual orders.   EXTRA RED TOP TUBE   EXTRA GREEN TOP (LITHIUM HEPARIN) TUBE   EXTRA BLOOD BANK PURPLE TOP TUBE   ROUTINE UA WITH MICROSCOPIC REFLEX TO CULTURE   PERIPHERAL IV CATHETER   CARDIAC CONTINUOUS MONITORING    EXTRA TUBE    Narrative:     The following orders were created for panel order [place] Draw.  Procedure                               Abnormality         Status                     ---------                               -----------         ------                     Extra Red Top Tube[[id number]]                               Final result               Extra Green Top (Lithium...[[id number]]                      Final result               Extra Blood Bank Purple ...[[id number]]                      Final result                 Please view results for these tests on the individual orders.     Treatments provided: Keppra, Ativan  Family Comments:  at bedside  OBS brochure/video discussed/provided to patient:  Yes  ED Medications:   Medications   gadobutrol (GADAVIST) injection 10 mL (has no administration in time range)   LORazepam (ATIVAN) injection 0.5 mg (0.5 mg Intravenous Given 8/25/21 0624)   levETIRAcetam (KEPPRA) intermittent infusion 1,000 mg (1,000 mg Intravenous New Bag 8/25/21 0751)     Drips infusing:  No  For the majority of the shift, the patient's behavior Green. Interventions performed were N/A.    Sepsis treatment initiated: No     Patient tested for COVID 19 prior to admission: YES    ED Nurse Name/Phone Number: Fidelina Jones RN,   9:06 AM

## 2021-08-25 NOTE — ED TRIAGE NOTES
Pt louis reported pt was having L arm numbness the whole of yesteray  - was sluggish today   -went to bathroom this morning, ws found unresponsive not sure if she was convulsive or had just passed out  - per EMS L side was not moving but was able to move R side freely  -  - Took 81mg aspirin this am

## 2021-08-25 NOTE — ED NOTES
0622 pt has a witness seizure lasting about 2 minutes. Ativen 0.5mg given. Continuing on sz precautions

## 2021-09-17 ENCOUNTER — TRANSFERRED RECORDS (OUTPATIENT)
Dept: HEALTH INFORMATION MANAGEMENT | Facility: CLINIC | Age: 56
End: 2021-09-17

## 2021-09-18 ENCOUNTER — HEALTH MAINTENANCE LETTER (OUTPATIENT)
Age: 56
End: 2021-09-18

## 2021-09-21 ENCOUNTER — ALLIED HEALTH/NURSE VISIT (OUTPATIENT)
Dept: NURSING | Facility: CLINIC | Age: 56
End: 2021-09-21
Payer: COMMERCIAL

## 2021-09-21 DIAGNOSIS — G08 CEREBRAL VENOUS THROMBOSIS: ICD-10-CM

## 2021-09-21 DIAGNOSIS — G40.909 RECURRENT SEIZURES (H): Primary | ICD-10-CM

## 2021-09-21 PROCEDURE — 99207 PR NO CHARGE NURSE ONLY: CPT

## 2021-09-21 RX ORDER — LEVETIRACETAM 1000 MG/1
1000 TABLET ORAL 2 TIMES DAILY
Qty: 60 TABLET | Refills: 0 | Status: SHIPPED | OUTPATIENT
Start: 2021-09-21 | End: 2021-10-05

## 2021-09-21 RX ORDER — LEVETIRACETAM 1000 MG/1
1000 TABLET ORAL 2 TIMES DAILY
COMMUNITY
Start: 2021-08-28 | End: 2021-09-21

## 2021-09-21 NOTE — PROGRESS NOTES
S-(situation): Pt walked in to clinic with medication question    B-(background): Pt noted she had a seizure, went to hospital and was given medication advised to follow-up. Pt noted she will run out of medication before being seen.    A-(assessment): Writer advised will reconcile medication, pend and send to provider to review.     R-(recommendations): as above.

## 2021-09-24 ENCOUNTER — MYC MEDICAL ADVICE (OUTPATIENT)
Dept: FAMILY MEDICINE | Facility: CLINIC | Age: 56
End: 2021-09-24

## 2021-09-24 ENCOUNTER — TELEPHONE (OUTPATIENT)
Dept: FAMILY MEDICINE | Facility: CLINIC | Age: 56
End: 2021-09-24

## 2021-09-24 DIAGNOSIS — G08 CEREBRAL VENOUS THROMBOSIS: Primary | ICD-10-CM

## 2021-09-27 PROBLEM — G08 CEREBRAL VENOUS THROMBOSIS: Status: ACTIVE | Noted: 2021-08-26

## 2021-09-27 PROBLEM — I60.9 SUBARACHNOID HEMORRHAGE (H): Status: ACTIVE | Noted: 2021-08-01

## 2021-09-28 ENCOUNTER — MYC MEDICAL ADVICE (OUTPATIENT)
Dept: FAMILY MEDICINE | Facility: CLINIC | Age: 56
End: 2021-09-28

## 2021-09-28 ENCOUNTER — OFFICE VISIT (OUTPATIENT)
Dept: FAMILY MEDICINE | Facility: CLINIC | Age: 56
End: 2021-09-28
Payer: COMMERCIAL

## 2021-09-28 VITALS
HEIGHT: 66 IN | DIASTOLIC BLOOD PRESSURE: 74 MMHG | TEMPERATURE: 98.6 F | OXYGEN SATURATION: 100 % | WEIGHT: 185 LBS | BODY MASS INDEX: 29.73 KG/M2 | HEART RATE: 88 BPM | SYSTOLIC BLOOD PRESSURE: 120 MMHG

## 2021-09-28 DIAGNOSIS — G40.909 RECURRENT SEIZURES (H): ICD-10-CM

## 2021-09-28 DIAGNOSIS — D64.9 ANEMIA, UNSPECIFIED TYPE: ICD-10-CM

## 2021-09-28 DIAGNOSIS — Z13.1 SCREENING FOR DIABETES MELLITUS: ICD-10-CM

## 2021-09-28 DIAGNOSIS — G08 CEREBRAL VENOUS THROMBOSIS: ICD-10-CM

## 2021-09-28 DIAGNOSIS — R97.1 ELEVATED CA-125: ICD-10-CM

## 2021-09-28 DIAGNOSIS — I60.9 SUBARACHNOID HEMORRHAGE (H): Primary | ICD-10-CM

## 2021-09-28 DIAGNOSIS — L91.8 SKIN TAG: ICD-10-CM

## 2021-09-28 DIAGNOSIS — Z12.11 SCREEN FOR COLON CANCER: ICD-10-CM

## 2021-09-28 DIAGNOSIS — B35.1 ONYCHOMYCOSIS: ICD-10-CM

## 2021-09-28 PROBLEM — R29.898 WEAKNESS OF LEFT ARM: Status: RESOLVED | Noted: 2021-08-25 | Resolved: 2021-09-28

## 2021-09-28 LAB
CANCER AG125 SERPL-ACNC: 17 U/ML (ref 0–30)
ERYTHROCYTE [DISTWIDTH] IN BLOOD BY AUTOMATED COUNT: 17.7 % (ref 10–15)
HCT VFR BLD AUTO: 30.3 % (ref 35–47)
HGB BLD-MCNC: 9 G/DL (ref 11.7–15.7)
MCH RBC QN AUTO: 21.4 PG (ref 26.5–33)
MCHC RBC AUTO-ENTMCNC: 29.7 G/DL (ref 31.5–36.5)
MCV RBC AUTO: 72 FL (ref 78–100)
PLATELET # BLD AUTO: 264 10E3/UL (ref 150–450)
RBC # BLD AUTO: 4.21 10E6/UL (ref 3.8–5.2)
WBC # BLD AUTO: 3.8 10E3/UL (ref 4–11)

## 2021-09-28 PROCEDURE — 82728 ASSAY OF FERRITIN: CPT | Performed by: PHYSICIAN ASSISTANT

## 2021-09-28 PROCEDURE — 83550 IRON BINDING TEST: CPT | Performed by: PHYSICIAN ASSISTANT

## 2021-09-28 PROCEDURE — 85027 COMPLETE CBC AUTOMATED: CPT | Performed by: PHYSICIAN ASSISTANT

## 2021-09-28 PROCEDURE — 86304 IMMUNOASSAY TUMOR CA 125: CPT | Performed by: PHYSICIAN ASSISTANT

## 2021-09-28 PROCEDURE — 36415 COLL VENOUS BLD VENIPUNCTURE: CPT | Performed by: PHYSICIAN ASSISTANT

## 2021-09-28 PROCEDURE — 80053 COMPREHEN METABOLIC PANEL: CPT | Performed by: PHYSICIAN ASSISTANT

## 2021-09-28 PROCEDURE — 99215 OFFICE O/P EST HI 40 MIN: CPT | Performed by: PHYSICIAN ASSISTANT

## 2021-09-28 ASSESSMENT — MIFFLIN-ST. JEOR: SCORE: 1455.9

## 2021-09-28 NOTE — TELEPHONE ENCOUNTER
My chart message sent     Josephine Pleitez RN, BSN  [facility]

## 2021-09-28 NOTE — PROGRESS NOTES
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  Recurrent seizures (H) - from SAH - Dr. Danielito Ewing Neurology   Doing quite well.  Appointment with neurology 10/1/2021.  Will defer clearance for driving to them.  Please have them send me a copy of their note/recommendations.  - CBC with platelets  - Oncology/Hematology Adult Referral  - CBC with platelets    Cerebral venous thrombosis  Factor II and factor V test normal.  Suspect due to oral contraceptives.  Recommend consultation with the Center for bleeding and clotting disorders to determine the length of time for anticoagulation.  Patient voiced understanding agreement.  - Oncology/Hematology Adult Referral    Anemia, unspecified type  Menorrhagia  Patient stopped oral contraceptives on 9/25.  Is taking oral iron since discharge.  CBC shows a slight improvement since discharge.  Is having a consultation with her OB/GYN to discuss alternative treatment options to oral contraceptives for menorrhagia tomorrow.  - Ferritin  - Iron and iron binding capacity  - CBC with platelets  Numerous skin tags patient would like to have removed.  Advised that she could schedule a  Elevated CA-125  Drawn at the emergency room.  Repeat for surveillance.  -     Onychomycosis  Patient reports fungal nails.  Recommend topical Lamisil/Lotrimin and if no improvement with diligent use to discuss at her annual physical once more time has elapsed since her seizures/hospitalization.    Skin tag  Patient reports multiple skin tags.  Historically has had dermatology remove them.  She will schedule a skin tag removal at her earliest convenience.    Screening for diabetes mellitus  Routine screening  - Comprehensive metabolic panel (BMP + Alb, Alk Phos, ALT, AST, Total. Bili, TP)    Screen for colon cancer  Routine screening  - Adult Gastro Ref - Procedure Only      Review of prior external note(s) from - CareEverywhere  "information from [place] reviewed  65 minutes spent on the date of the encounter doing chart review, history and exam, documentation and further activities per the note       BMI:   Estimated body mass index is 29.86 kg/m  as calculated from the following:    Height as of this encounter: 1.676 m (5' 6\").    Weight as of this encounter: 83.9 kg (185 lb).   Weight management plan: Discussed healthy diet and exercise guidelines    No follow-ups on file.    Roberta Garcia PA-C  [facility] [place]    Amadeo Kaye is a 56 year old who presents for the following health issues     HPI       Hospital Follow-up Visit:    Hospital/Nursing Home/IP Rehab Facility: [facility]  Date of Admission: 08/25/2021  Date of Discharge: 08/28/2021  Reason(s) for Admission: Seizure       Was your hospitalization related to COVID-19? No   Problems taking medications regularly:  None  Medication changes since discharge: None  Problems adhering to non-medication therapy:  None    Summary of hospitalization:  CareEverywhere information obtained and reviewed.    Mikki Das is a 56 y.o. female with no significant past medical history who first noted some left arm numbness & tingling last evening. The day of arrival, she had similar symptoms which then progressed to a cramping feeling in her hand and was followed by a LOC with jerking movements.    She was brought to [facility] where she again had a witnessed episode of left hand paresthesias followed by LOC and jerking movement c/w seizures. She was loaded with Keppra. Head imaging with right cortical cerebral venous thrombosis resulting in a right sided SDH and SAH. Transferred to [facility] ICU.    In the ICU, Neurology consulted and patient started on anticoagulation with heparin for thrombosis with right SDH and SAH caused by use of estrogen containing birh control pills. Video EEG remained normal without evidence of seizures. " "Clinically stable and transferred to general neurology unit 8/26/2021.  On transfer to neurology unit, patient transitioned to oral anticoagulation on 8/27. Factor 2 and factor V Leiden gene mutation negative. Repeat head CT unchanged from prior.     Patient symptomatically improved and was stable to discharge home on 8/28/2021 with Keppra for seizure prevention and xarelto , instructed to stop taking birth control pills. Patient was instructed to follow up with PCP within the next 5 days and with Dr. Danielito Graham of Neurology in 4-6 weeks.      Diagnostic Tests/Treatments reviewed.  Follow up needed: Seeing Neurology Oct 1 2021  OCCUPATIONAL THERAPY 1 week ago - cleared - no further therapy  PHYSICAL THERAPY - cleared yesterday - no further therapy  Other Healthcare Providers Involved in Patient s Care:         Specialist appointment - Neurology and Physical Therapy  Update since discharge: improved. Post Discharge Medication Reconciliation: discharge medications reconciled, continue medications without change.  Plan of care communicated with patient              Review of Systems   Constitutional, HEENT, cardiovascular, pulmonary, GI, , musculoskeletal, neuro, skin, endocrine and psych systems are negative, except as otherwise noted.      Objective    /74   Pulse 88   Temp 98.6  F (37  C) (Tympanic)   Ht 1.676 m (5' 6\")   Wt 83.9 kg (185 lb)   SpO2 100%   Breastfeeding No   BMI 29.86 kg/m    Body mass index is 29.86 kg/m .  Physical Exam   GENERAL: healthy, alert and no distress  EYES: Eyes grossly normal to inspection, PERRL and conjunctivae and sclerae normal  HENT: ear canals and TM's normal, nose and mouth without ulcers or lesions  NECK: no adenopathy, no asymmetry, masses, or scars and thyroid normal to palpation  RESP: lungs clear to auscultation - no rales, rhonchi or wheezes  CV: regular rate and rhythm, normal S1 S2, no S3 or S4, no murmur, click or rub, no peripheral edema and peripheral " pulses strong  MS: no gross musculoskeletal defects noted, no edema  SKIN: no suspicious lesions or rashes  NEURO: cranial nerves II-XII grossly intact, gross motor exam normal, gait normal  PSYCH: mentation appears normal, affect normal/bright    Results for orders placed or performed in visit on 09/28/21 (from the past 24 hour(s))   CBC with platelets   Result Value Ref Range    WBC Count 3.8 (L) 4.0 - 11.0 10e3/uL    RBC Count 4.21 3.80 - 5.20 10e6/uL    Hemoglobin 9.0 (L) 11.7 - 15.7 g/dL    Hematocrit 30.3 (L) 35.0 - 47.0 %    MCV 72 (L) 78 - 100 fL    MCH 21.4 (L) 26.5 - 33.0 pg    MCHC 29.7 (L) 31.5 - 36.5 g/dL    RDW 17.7 (H) 10.0 - 15.0 %    Platelet Count 264 150 - 450 10e3/uL

## 2021-09-28 NOTE — Clinical Note
Quality pool - please update HM to at least satisfy the pap completion of 12/2020 (see salvatore BOWEN progress note).  Will get report to see if HPV completed - per pt - the results were normal.

## 2021-09-29 LAB
ALBUMIN SERPL-MCNC: 3.9 G/DL (ref 3.4–5)
ALP SERPL-CCNC: 63 U/L (ref 40–150)
ALT SERPL W P-5'-P-CCNC: 22 U/L (ref 0–50)
ANION GAP SERPL CALCULATED.3IONS-SCNC: 7 MMOL/L (ref 3–14)
AST SERPL W P-5'-P-CCNC: 8 U/L (ref 0–45)
BILIRUB SERPL-MCNC: 0.4 MG/DL (ref 0.2–1.3)
BUN SERPL-MCNC: 16 MG/DL (ref 7–30)
CALCIUM SERPL-MCNC: 9.5 MG/DL (ref 8.5–10.1)
CHLORIDE BLD-SCNC: 109 MMOL/L (ref 94–109)
CO2 SERPL-SCNC: 25 MMOL/L (ref 20–32)
CREAT SERPL-MCNC: 0.96 MG/DL (ref 0.52–1.04)
FERRITIN SERPL-MCNC: 3 NG/ML (ref 8–252)
GFR SERPL CREATININE-BSD FRML MDRD: 67 ML/MIN/1.73M2
GLUCOSE BLD-MCNC: 82 MG/DL (ref 70–99)
IRON SATN MFR SERPL: 54 % (ref 15–46)
IRON SERPL-MCNC: 214 UG/DL (ref 35–180)
POTASSIUM BLD-SCNC: 4.3 MMOL/L (ref 3.4–5.3)
PROT SERPL-MCNC: 6.9 G/DL (ref 6.8–8.8)
SODIUM SERPL-SCNC: 141 MMOL/L (ref 133–144)
TIBC SERPL-MCNC: 394 UG/DL (ref 240–430)

## 2021-09-29 NOTE — TELEPHONE ENCOUNTER
Form completed and placed in [facility] inbox.  Please fax asap.      Roberta Garcia MBA, MS, PA-C

## 2021-09-29 NOTE — TELEPHONE ENCOUNTER
Coverage information:     Subscriber: [id number] CRISPINM[employer]ON     Rel to sub: 01 - Self     Member ID: [id number]     Payor: [address] [place] [address] Ph: [phone]     Benefit plan: [id number] Ph: [phone]     Group number: [id number]     Member effective dates: from 01/01/20      Writer called Marlene at number given. Spoke to Representative, noted was prescribed for venous thrombosis. Rep noted will fax form, once received will give to provider to fill out, review, and sign if agree.    Elmer VICENTE RN   [facility]

## 2021-09-29 NOTE — RESULT ENCOUNTER NOTE
Mikki  I have reviewed your recent labs. Here are the results:    -Your anemia has slightly improved from 1 month ago with the resumption of your iron pills.  Your iron is low (both iron and ferritin which are the iron stores) which is expected with your heavy menstrual cycles.  Continue this for now until you determine what your plan is moving forward for your heavy menstrual cycles with your OB/GYN.  If you continue to have the heavy cycles and are not going to pursue a surgical option we could always reconsider the iron infusions.  Just let me know if you want to move forward with that plan.  -White blood cell and platelet counts are normal.  -Liver and gallbladder tests are normal (ALT,AST, Alk phos, bilirubin), kidney function is normal (Cr, GFR), sodium is normal, potassium is normal, calcium is normal, glucose is normal.  (These have all normalized since your hospitalization).  -The tumor marker  has normalized.  As we discussed, this is not a very accurate test when you are having a stress reaction on the body.    For additional lab test information, labtestsonline.org is an excellent reference.        If you have any questions please do not hesitate to contact our office via phone ([phone]) or MyChart.    Roberta Garcia, MS, PA-C  [facility] [place]

## 2021-09-29 NOTE — TELEPHONE ENCOUNTER
Completed forms faxed back to [facility] at [phone].   Originals sent to be scanned.       Janine Sanders

## 2021-09-30 NOTE — TELEPHONE ENCOUNTER
PRIOR AUTHORIZATION DENIED    Medication: rivaroxaban ANTICOAGULANT (XARELTO) 20 MG TABS tablet - DENIED     Denial Date: 9/24/2021    Denial Rational:       Appeal Information:

## 2021-09-30 NOTE — TELEPHONE ENCOUNTER
Form was completed and faxed yesterday.  Can you follow-up to see if this was approved for the patient?

## 2021-10-01 ENCOUNTER — TELEPHONE (OUTPATIENT)
Dept: FAMILY MEDICINE | Facility: CLINIC | Age: 56
End: 2021-10-01

## 2021-10-01 ENCOUNTER — MYC MEDICAL ADVICE (OUTPATIENT)
Dept: FAMILY MEDICINE | Facility: CLINIC | Age: 56
End: 2021-10-01

## 2021-10-01 DIAGNOSIS — G08 CEREBRAL VENOUS THROMBOSIS: Primary | ICD-10-CM

## 2021-10-01 RX ORDER — DABIGATRAN ETEXILATE 150 MG/1
150 CAPSULE ORAL 2 TIMES DAILY
Qty: 60 CAPSULE | Refills: 2 | Status: SHIPPED | OUTPATIENT
Start: 2021-10-01 | End: 2021-10-05

## 2021-10-01 NOTE — TELEPHONE ENCOUNTER
Gurdeep Granados:    There is not great data out there when it comes to the DOACs for cerebral venous thromboembolism, but I think an appeal likely could be attempted for Xarelto.  If an alternative is necessary Pradaxa (dabigatran) probably has the best data of the non-warfarin options per the RE-SPECT CVT trial and one other that should good efficacy/comparable safety. It appears that this is option per the denial letter.    Let us know if you have any additional questions,    William Sepulveda, PharmD, BCACP  Medication Therapy Management Pharmacist  Pager: [phone]

## 2021-10-01 NOTE — TELEPHONE ENCOUNTER
Gurdeep Granados:    Yes - sorry.  This would be Pradaxa 150 mg twice daily.  Usually the patient would not start until the next dose of Xarelto would be due to transition.    William Sepulveda, PharmD, [facility]  Medication Therapy Management Pharmacist  Pager: [phone]

## 2021-10-01 NOTE — TELEPHONE ENCOUNTER
Triage: please advise pt that despite our PA attempt, xaralto is not covered by her insurance.  We will transition her to Pradaxa (she can complete her current RX of xaralto and then start the pradaxa once she runs out.  This RX is on her preferred drug list we received from her insurance.  It is TWICE DAILY, however.      Please ensure the pt schedules her hematology follow up appt as well, as I would like to have them determine the length of need for anticoagulation based on her history.      Roberta Garcia MBA, MS, PA-C

## 2021-10-01 NOTE — TELEPHONE ENCOUNTER
Called # [phone]     Called patient to discuss below info.  Pt had already received a phone call fro pharmacy saying the new RX, Pradaxa, had been denied.  This RN called her pharmacy [facility] in [place] to inquire if this was denied due to having xarelto filled  And left yet or would need a prior auth.  Spoke with Pharmacist and the Pradaxa will also need a prior auth.  dabigatran ANTICOAGULANT (PRADAXA) 150 MG capsule 60 capsule 2 10/1/2021  --   Sig - Route: Take 1 capsule (150 mg) by mouth 2 times daily Store in original 's bottle or blister pack; use within 120 days of opening. - Oral   Sent to pharmacy as: Dabigatran Etexilate Mesylate 150 MG Oral Capsule (PRADAXA)           Norma LU RN, BSN  [facility]

## 2021-10-04 ENCOUNTER — MYC MEDICAL ADVICE (OUTPATIENT)
Dept: FAMILY MEDICINE | Facility: CLINIC | Age: 56
End: 2021-10-04

## 2021-10-04 DIAGNOSIS — G40.909 RECURRENT SEIZURES (H): ICD-10-CM

## 2021-10-04 NOTE — TELEPHONE ENCOUNTER
Please see my chart message below     Please review and advise     Thank you     Josephine Pleitez RN, BSN  [place] [facility]

## 2021-10-05 RX ORDER — LEVETIRACETAM 1000 MG/1
1000 TABLET ORAL 2 TIMES DAILY
Qty: 180 TABLET | Refills: 0 | Status: SHIPPED | OUTPATIENT
Start: 2021-10-05

## 2021-10-05 RX ORDER — LEVETIRACETAM 1000 MG/1
1000 TABLET ORAL 2 TIMES DAILY
Qty: 90 TABLET | Refills: 0 | Status: SHIPPED | OUTPATIENT
Start: 2021-10-05 | End: 2021-10-05

## 2021-10-05 NOTE — TELEPHONE ENCOUNTER
Please advise the original request for Xarelto has been approved through patient's insurance.     Does provider still want a PA done for Pradaxa?  If not please call pharmacy and cancel this Rx.    The pharmacy also just needs a new Rx for the Xarelto 20mg, they only received a one time Rx in September for this medication per pharmacist.    Approval letter for Xarelto 20mg was scanned into patient's chart and also copied below. Per [place] message from 10/4 from patient she is aware that the appeal has been approved.

## 2021-10-05 NOTE — TELEPHONE ENCOUNTER
Iris-  I printed a copy of this encounter but if you could address the insurance concern, please?  I have never heard of this before.      Roberta Garcia MBA, MS, PA-C

## 2021-10-05 NOTE — TELEPHONE ENCOUNTER
Please see my chart message below     Please review and advise     Thank you     Josephine Pleitez RN, BSN  [place] [facility]

## 2021-10-05 NOTE — TELEPHONE ENCOUNTER
Please see my chart message below     Please review and advise     Thank you     Josephine Pleitez RN, BSN  [place] [facility]

## 2021-10-05 NOTE — TELEPHONE ENCOUNTER
I will send to contract management for [employer] and see if there is more to this as we typically would see provider's in network with the clinic -not just one.    I will send you follow up once I hear back.    Iris NAVARRO

## 2021-10-08 ENCOUNTER — TRANSFERRED RECORDS (OUTPATIENT)
Dept: HEALTH INFORMATION MANAGEMENT | Facility: CLINIC | Age: 56
End: 2021-10-08

## 2022-01-02 DIAGNOSIS — G08 CEREBRAL VENOUS THROMBOSIS: ICD-10-CM

## 2022-01-04 NOTE — TELEPHONE ENCOUNTER
Routing refill request to provider for review/approval because:  Fails protocol  Rody VAUGHN RN, BSN

## 2022-01-04 NOTE — TELEPHONE ENCOUNTER
Please clarify with pt - is Dr. Phelan of hematology ([facility]) managing this RX now?  I have not heard the length of time she will need to be on this RX       Roberta Garcia MBA, MS, PA-C

## 2022-01-06 RX ORDER — RIVAROXABAN 20 MG/1
TABLET, FILM COATED ORAL
Qty: 90 TABLET | Refills: 0 | Status: SHIPPED | OUTPATIENT
Start: 2022-01-06

## 2022-01-06 NOTE — TELEPHONE ENCOUNTER
Per notes that were scanned in on 10/8, to do a 6 month course.     Called patient to verify. 6 month course. Refill sent in for 90 days.     Vania Austin RN  [facility]

## 2022-01-08 ENCOUNTER — HEALTH MAINTENANCE LETTER (OUTPATIENT)
Age: 57
End: 2022-01-08

## 2022-02-24 ENCOUNTER — TRANSFERRED RECORDS (OUTPATIENT)
Dept: HEALTH INFORMATION MANAGEMENT | Facility: CLINIC | Age: 57
End: 2022-02-24
Payer: COMMERCIAL

## 2022-04-28 ENCOUNTER — HOSPITAL ENCOUNTER (EMERGENCY)
Facility: CLINIC | Age: 57
Discharge: HOME | End: 2022-04-28
Attending: EMERGENCY MEDICINE | Admitting: EMERGENCY MEDICINE
Payer: COMMERCIAL

## 2022-04-28 ENCOUNTER — APPOINTMENT (OUTPATIENT)
Dept: MRI IMAGING | Facility: CLINIC | Age: 57
End: 2022-04-28
Attending: EMERGENCY MEDICINE
Payer: COMMERCIAL

## 2022-04-28 VITALS
WEIGHT: 190.26 LBS | SYSTOLIC BLOOD PRESSURE: 150 MMHG | RESPIRATION RATE: 18 BRPM | DIASTOLIC BLOOD PRESSURE: 103 MMHG | OXYGEN SATURATION: 98 % | BODY MASS INDEX: 30.71 KG/M2 | TEMPERATURE: 97.7 F | HEART RATE: 67 BPM

## 2022-04-28 DIAGNOSIS — Z86.718 HISTORY OF CEREBRAL VENOUS SINUS THROMBOSIS: ICD-10-CM

## 2022-04-28 DIAGNOSIS — R20.2 PARESTHESIAS: ICD-10-CM

## 2022-04-28 LAB
ANION GAP SERPL CALCULATED.3IONS-SCNC: 4 MMOL/L (ref 3–14)
BASOPHILS # BLD AUTO: 0.1 10E3/UL (ref 0–0.2)
BASOPHILS NFR BLD AUTO: 1 %
BUN SERPL-MCNC: 17 MG/DL (ref 7–30)
CALCIUM SERPL-MCNC: 8.8 MG/DL (ref 8.5–10.1)
CHLORIDE BLD-SCNC: 107 MMOL/L (ref 94–109)
CO2 SERPL-SCNC: 27 MMOL/L (ref 20–32)
CREAT SERPL-MCNC: 0.78 MG/DL (ref 0.52–1.04)
EOSINOPHIL # BLD AUTO: 0.1 10E3/UL (ref 0–0.7)
EOSINOPHIL NFR BLD AUTO: 1 %
ERYTHROCYTE [DISTWIDTH] IN BLOOD BY AUTOMATED COUNT: 13.4 % (ref 10–15)
GFR SERPL CREATININE-BSD FRML MDRD: 90 ML/MIN/1.73M2
GLUCOSE BLD-MCNC: 91 MG/DL (ref 70–99)
GLUCOSE BLDC GLUCOMTR-MCNC: 71 MG/DL (ref 70–99)
HCT VFR BLD AUTO: 41.5 % (ref 35–47)
HGB BLD-MCNC: 13.6 G/DL (ref 11.7–15.7)
HOLD SPECIMEN: NORMAL
IMM GRANULOCYTES # BLD: 0 10E3/UL
IMM GRANULOCYTES NFR BLD: 0 %
LYMPHOCYTES # BLD AUTO: 1.8 10E3/UL (ref 0.8–5.3)
LYMPHOCYTES NFR BLD AUTO: 31 %
MCH RBC QN AUTO: 31.3 PG (ref 26.5–33)
MCHC RBC AUTO-ENTMCNC: 32.8 G/DL (ref 31.5–36.5)
MCV RBC AUTO: 96 FL (ref 78–100)
MONOCYTES # BLD AUTO: 0.6 10E3/UL (ref 0–1.3)
MONOCYTES NFR BLD AUTO: 10 %
NEUTROPHILS # BLD AUTO: 3.4 10E3/UL (ref 1.6–8.3)
NEUTROPHILS NFR BLD AUTO: 57 %
NRBC # BLD AUTO: 0 10E3/UL
NRBC BLD AUTO-RTO: 0 /100
PLATELET # BLD AUTO: 271 10E3/UL (ref 150–450)
POTASSIUM BLD-SCNC: 3.6 MMOL/L (ref 3.4–5.3)
RBC # BLD AUTO: 4.34 10E6/UL (ref 3.8–5.2)
SODIUM SERPL-SCNC: 138 MMOL/L (ref 133–144)
WBC # BLD AUTO: 5.9 10E3/UL (ref 4–11)

## 2022-04-28 PROCEDURE — 93005 ELECTROCARDIOGRAM TRACING: CPT

## 2022-04-28 PROCEDURE — 70544 MR ANGIOGRAPHY HEAD W/O DYE: CPT | Mod: XS

## 2022-04-28 PROCEDURE — 99285 EMERGENCY DEPT VISIT HI MDM: CPT | Mod: 25

## 2022-04-28 PROCEDURE — A9585 GADOBUTROL INJECTION: HCPCS | Performed by: EMERGENCY MEDICINE

## 2022-04-28 PROCEDURE — 36415 COLL VENOUS BLD VENIPUNCTURE: CPT | Performed by: EMERGENCY MEDICINE

## 2022-04-28 PROCEDURE — 82310 ASSAY OF CALCIUM: CPT | Performed by: EMERGENCY MEDICINE

## 2022-04-28 PROCEDURE — 70549 MR ANGIOGRAPH NECK W/O&W/DYE: CPT

## 2022-04-28 PROCEDURE — 255N000002 HC RX 255 OP 636: Performed by: EMERGENCY MEDICINE

## 2022-04-28 PROCEDURE — 85025 COMPLETE CBC W/AUTO DIFF WBC: CPT | Performed by: EMERGENCY MEDICINE

## 2022-04-28 PROCEDURE — 70546 MR ANGIOGRAPH HEAD W/O&W/DYE: CPT

## 2022-04-28 PROCEDURE — 70553 MRI BRAIN STEM W/O & W/DYE: CPT

## 2022-04-28 RX ORDER — GADOBUTROL 604.72 MG/ML
10 INJECTION INTRAVENOUS ONCE
Status: COMPLETED | OUTPATIENT
Start: 2022-04-28 | End: 2022-04-28

## 2022-04-28 RX ADMIN — GADOBUTROL 10 ML: 604.72 INJECTION INTRAVENOUS at 19:04

## 2022-04-28 NOTE — CONSULTS
"    [facility]    Stroke Telephone Note    I was called by Dr. Gary Villanueva on 04/28/22 regarding patient Mikki Das. The patient is a 56 year old female with PMH of cerebral venous thrombosis, (August 2021, treated with Xarelto). She presents to [facility] on 4/28/2022 for evaluation of L arm paresthesias and headache that have been on and off for one week. ED BP: 167/111    Of note, her cerebral venous thrombosis from August occurred while she was on birth control, which was discontinued at that time.     Imaging Findings   MRI brain with and without: pending  MRV brain: pending  MRV neck: pending  MRA brain: pending  MRA neck: pending    Impression  56 year old female with PMH of CVT, presents with headache and L arm paraesthesias that have been on and off for one week. Further imaging recommended to rule out CVT.     Recommendations   - MRI brain with and without  - MRV brain   - MRV neck  - MRA brain  - MRA neck    -- please re-page stroke neuro when imaging is complete for further recommendations       My recommendations are based on the information provided over the phone by Mikki Das's in-person providers. They are not intended to replace the clinical judgment of her in-person providers. I was not requested to personally see or examine the patient at this time.    REN Avila, CNP  Vascular Neurology  To page me or covering stroke neurology team member, click here: AMCOM   Choose \"On Call\" tab at top, then search dropdown box for \"Neurology Adult\", select location, press Enter, then look for stroke/neuro ICU/telestroke.         "

## 2022-04-28 NOTE — ED PROVIDER NOTES
History     Chief Complaint:    Numbness      HPI   Mikki Das is a 56 year old female with history of cerebral venous thrombosis presenting with seizure in August 2021 who presents for evaluation of paresthesias and headache.  Of note, the above issue occurred in the context of taking birth control, the patient was started on Xarelto at that time, and she has discontinued both birth control and Xarelto since.  The patient notes that 1 week ago she had a headache for a few days which is subsequently resolved.  Subsequently, she has had paresthesias to the hands, left arm, feet, intermittently since then.  She noted more persistent paresthesias to the left arm, which concerned her about possible recurrent thrombosis.  She had a negative MRI brain and MR venogram in February 2022 from her neurologist.  She denies fevers, chills, restaurant symptoms, COVID-19, or any other concerns.    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 08/2021     Patient Active Problem List    Diagnosis Date Noted     Cerebral venous thrombosis 08/26/2021     Priority: Medium     Recurrent seizures (H) - from [facility] - Dr. 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 08/2021     Priority: Medium     subarachnoid hemorrhage from right frontal cortical vein thrombosis.          Past Surgical History:    Past Surgical History:   Procedure Laterality Date     D & C  1996       Family History:    Family History   Problem Relation Age of Onset     No Known Problems Mother      Lipids Father      Alzheimer Disease Father       Hypertension Father      Macular Degeneration Father      Melanoma Maternal Grandmother      Breast Cancer Maternal Grandmother 88     Cancer Paternal Grandfather      Colon Cancer Maternal Aunt 65       Social History:      Physical Exam     Patient Vitals for the past 24 hrs:   BP Temp Temp src Pulse Resp SpO2 Weight   04/28/22 1600 -- -- -- -- -- -- 86.3 kg (190 lb 4.1 oz)   04/28/22 1548 (!) 167/111 97.7  F (36.5  C) Oral 73 18 100 % --       Physical Exam  Constitutional: Alert, attentive  HENT:    Nose: Nose normal.    Mouth/Throat: Oropharynx is clear, mucous membranes are moist  Eyes: EOM are normal. Pupils are equal, round, and reactive to light.   CV: Regular rate and rhythm, no murmurs, rubs or gallops.  Chest: Effort normal and breath sounds normal.   GI: No distension. There is no tenderness  MSK: Normal range of motion.   Neurological:   A/Ox3;   Cranial nerves 2-12 intact;   5/5 strength throughout the upper and lower extremities;   sensation intact to light touch throughout the upper and lower extremities;   Skin: Skin is warm and dry.        Emergency Department Course   ECG:  NSR, normal interval and axis, rate 69, no significant change from 8/25/21    Imaging:  MR Brain w/o & w Contrast   Final Result   IMPRESSION:   HEAD MRI:    1.  No acute infarct, mass, mass effect, or hemorrhage.   2.  Small amount of chronic hemosiderin deposition subarachnoid spaces of the anterolateral right frontal lobe; likely sequela from subarachnoid hemorrhage present on the prior examination.       HEAD MRV:    1.  No dural venous sinus thrombosis or significant stenosis.      MRA Neck (Carotids) wo & w Contrast   Final Result   IMPRESSION:   1.  Normal neck MRA.      MRA Brain ([place]) wo Contrast   Final Result   IMPRESSION:   1.  Normal MRA [place].      MRV Brain wo & w Contrast   Final Result   IMPRESSION:   HEAD MRI:    1.  No acute infarct, mass, mass effect, or hemorrhage.   2.   Small amount of chronic hemosiderin deposition subarachnoid spaces of the anterolateral right frontal lobe; likely sequela from subarachnoid hemorrhage present on the prior examination.       HEAD MRV:    1.  No dural venous sinus thrombosis or significant stenosis.        Report per radiology    Laboratory:  Labs Ordered and Resulted from Time of ED Arrival to Time of ED Departure   GLUCOSE BY METER - Normal       Result Value    GLUCOSE BY METER POCT 71     BASIC METABOLIC PANEL - Normal    Sodium 138      Potassium 3.6      Chloride 107      Carbon Dioxide (CO2) 27      Anion Gap 4      Urea Nitrogen 17      Creatinine 0.78      Calcium 8.8      Glucose 91      GFR Estimate 90     CBC WITH PLATELETS AND DIFFERENTIAL    WBC Count 5.9      RBC Count 4.34      Hemoglobin 13.6      Hematocrit 41.5      MCV 96      MCH 31.3      MCHC 32.8      RDW 13.4      Platelet Count 271      % Neutrophils 57      % Lymphocytes 31      % Monocytes 10      % Eosinophils 1      % Basophils 1      % Immature Granulocytes 0      NRBCs per 100 WBC 0      Absolute Neutrophils 3.4      Absolute Lymphocytes 1.8      Absolute Monocytes 0.6      Absolute Eosinophils 0.1      Absolute Basophils 0.1      Absolute Immature Granulocytes 0.0      Absolute NRBCs 0.0           Emergency Department Course:             Reviewed:    I reviewed nursing notes, vitals and past medical history    Assessments:   I obtained history and examined the patient as noted above.    I rechecked the patient and explained findings.       Disposition:  The patient was discharged to home.     Impression & Plan      Medical Decision Making:  This is a 56-year-old female with history of apparent provoked cerebral venous thrombosis in August 2021, status post discontinuation of OCPs as well as finishing her course of Xarelto, who presents for evaluation of now resolved headache and paresthesias.  She has a normal neurologic exam.  Given the above, the above MR imaging  was obtained to rule out possible stroke, cervical vessel disease injury, intracranial mass or hemorrhage, recurrent venous sinus thrombosis.  Fortunately, imaging is negative.  Symptoms are not consistent with TIA, so no indication for anticoagulation in TIA clinic follow-up.  Plan outpatient neurology follow-up in 1 week and return precautions for any concerns of weakness, vision changes, headache, or other concerns.      Diagnosis:    ICD-10-CM    1. Paresthesias  R20.2    2. History of cerebral venous sinus thrombosis  Z86.718           Gary Villanueva MD  04/28/22 1236

## 2022-04-28 NOTE — ED TRIAGE NOTES
Pt reports that she has been having on and off tingling for the last wk, mostly in the left arm. PT denies pain. PT reports that she had a  Seizure last year and is now on keppra and was on blood thinners until February of this year,  reports that he has noticed some slurred speech since the seizure but no aphasia at this time. PT VSS and ABC's intact

## 2022-04-29 LAB
ATRIAL RATE - MUSE: 69 BPM
DIASTOLIC BLOOD PRESSURE - MUSE: NORMAL MMHG
INTERPRETATION ECG - MUSE: NORMAL
P AXIS - MUSE: 48 DEGREES
PR INTERVAL - MUSE: 158 MS
QRS DURATION - MUSE: 82 MS
QT - MUSE: 428 MS
QTC - MUSE: 458 MS
R AXIS - MUSE: 18 DEGREES
SYSTOLIC BLOOD PRESSURE - MUSE: NORMAL MMHG
T AXIS - MUSE: 45 DEGREES
VENTRICULAR RATE- MUSE: 69 BPM

## 2022-04-30 ENCOUNTER — HEALTH MAINTENANCE LETTER (OUTPATIENT)
Age: 57
End: 2022-04-30

## 2022-08-22 ENCOUNTER — HOSPITAL ENCOUNTER (OUTPATIENT)
Dept: CT IMAGING | Facility: CLINIC | Age: 57
Discharge: HOME | End: 2022-08-22
Attending: INTERNAL MEDICINE | Admitting: INTERNAL MEDICINE
Payer: COMMERCIAL

## 2022-08-22 DIAGNOSIS — R91.8 MULTIPLE PULMONARY NODULES: ICD-10-CM

## 2022-08-22 PROCEDURE — 250N000011 HC RX IP 250 OP 636: Performed by: INTERNAL MEDICINE

## 2022-08-22 PROCEDURE — 258N000003 HC RX IP 258 OP 636: Performed by: INTERNAL MEDICINE

## 2022-08-22 PROCEDURE — 71260 CT THORAX DX C+: CPT

## 2022-08-22 RX ORDER — IOPAMIDOL 755 MG/ML
500 INJECTION, SOLUTION INTRAVASCULAR ONCE
Status: COMPLETED | OUTPATIENT
Start: 2022-08-22 | End: 2022-08-22

## 2022-08-22 RX ADMIN — IOPAMIDOL 86 ML: 755 INJECTION, SOLUTION INTRAVENOUS at 08:56

## 2022-08-22 RX ADMIN — SODIUM CHLORIDE 62 ML: 9 INJECTION, SOLUTION INTRAVENOUS at 08:56

## 2022-08-29 ENCOUNTER — TRANSFERRED RECORDS (OUTPATIENT)
Dept: HEALTH INFORMATION MANAGEMENT | Facility: CLINIC | Age: 57
End: 2022-08-29

## 2022-10-13 ENCOUNTER — LAB REQUISITION (OUTPATIENT)
Dept: LAB | Facility: CLINIC | Age: 57
End: 2022-10-13
Payer: COMMERCIAL

## 2022-10-13 DIAGNOSIS — N92.0 EXCESSIVE AND FREQUENT MENSTRUATION WITH REGULAR CYCLE: ICD-10-CM

## 2022-10-13 PROCEDURE — 88305 TISSUE EXAM BY PATHOLOGIST: CPT | Mod: TC,ORL | Performed by: OBSTETRICS & GYNECOLOGY

## 2022-10-17 LAB
PATH REPORT.COMMENTS IMP SPEC: NORMAL
PATH REPORT.FINAL DX SPEC: NORMAL
PATH REPORT.GROSS SPEC: NORMAL
PATH REPORT.MICROSCOPIC SPEC OTHER STN: NORMAL
PATH REPORT.RELEVANT HX SPEC: NORMAL
PHOTO IMAGE: NORMAL

## 2022-10-17 PROCEDURE — 88305 TISSUE EXAM BY PATHOLOGIST: CPT | Mod: 26 | Performed by: STUDENT IN AN ORGANIZED HEALTH CARE EDUCATION/TRAINING PROGRAM

## 2022-11-20 ENCOUNTER — HEALTH MAINTENANCE LETTER (OUTPATIENT)
Age: 57
End: 2022-11-20

## 2023-04-15 ENCOUNTER — HEALTH MAINTENANCE LETTER (OUTPATIENT)
Age: 58
End: 2023-04-15

## 2023-08-23 ENCOUNTER — HOSPITAL ENCOUNTER (OUTPATIENT)
Dept: CT IMAGING | Facility: CLINIC | Age: 58
Discharge: HOME | End: 2023-08-23
Attending: INTERNAL MEDICINE | Admitting: INTERNAL MEDICINE
Payer: COMMERCIAL

## 2023-08-23 DIAGNOSIS — R91.8 MULTIPLE PULMONARY NODULES: ICD-10-CM

## 2023-08-23 PROCEDURE — 71250 CT THORAX DX C-: CPT

## 2023-08-30 ENCOUNTER — TRANSFERRED RECORDS (OUTPATIENT)
Dept: HEALTH INFORMATION MANAGEMENT | Facility: CLINIC | Age: 58
End: 2023-08-30
Payer: COMMERCIAL

## 2024-06-16 ENCOUNTER — HEALTH MAINTENANCE LETTER (OUTPATIENT)
Age: 59
End: 2024-06-16

## 2024-09-11 ENCOUNTER — TRANSFERRED RECORDS (OUTPATIENT)
Dept: HEALTH INFORMATION MANAGEMENT | Facility: CLINIC | Age: 59
End: 2024-09-11
Payer: COMMERCIAL

## 2025-04-19 ENCOUNTER — HEALTH MAINTENANCE LETTER (OUTPATIENT)
Age: 60
End: 2025-04-19

## 2025-06-21 ENCOUNTER — HEALTH MAINTENANCE LETTER (OUTPATIENT)
Age: 60
End: 2025-06-21